# Patient Record
Sex: FEMALE | Race: OTHER
[De-identification: names, ages, dates, MRNs, and addresses within clinical notes are randomized per-mention and may not be internally consistent; named-entity substitution may affect disease eponyms.]

---

## 2021-01-09 ENCOUNTER — HOSPITAL ENCOUNTER (EMERGENCY)
Dept: HOSPITAL 62 - ER | Age: 31
Discharge: HOME | End: 2021-01-09
Payer: OTHER GOVERNMENT

## 2021-01-09 VITALS — SYSTOLIC BLOOD PRESSURE: 106 MMHG | DIASTOLIC BLOOD PRESSURE: 67 MMHG

## 2021-01-09 DIAGNOSIS — Z88.0: ICD-10-CM

## 2021-01-09 DIAGNOSIS — R00.2: ICD-10-CM

## 2021-01-09 DIAGNOSIS — Z88.6: ICD-10-CM

## 2021-01-09 DIAGNOSIS — I72.8: Primary | ICD-10-CM

## 2021-01-09 LAB
ADD MANUAL DIFF: NO
ALBUMIN SERPL-MCNC: 4.1 G/DL (ref 3.5–5)
ALP SERPL-CCNC: 39 U/L (ref 38–126)
ANION GAP SERPL CALC-SCNC: 5 MMOL/L (ref 5–19)
APPEARANCE UR: CLEAR
APTT PPP: COLORLESS S
AST SERPL-CCNC: 20 U/L (ref 14–36)
BASOPHILS # BLD AUTO: 0 10^3/UL (ref 0–0.2)
BASOPHILS NFR BLD AUTO: 0.7 % (ref 0–2)
BILIRUB DIRECT SERPL-MCNC: 0.1 MG/DL (ref 0–0.4)
BILIRUB SERPL-MCNC: 0.3 MG/DL (ref 0.2–1.3)
BILIRUB UR QL STRIP: NEGATIVE
BUN SERPL-MCNC: 18 MG/DL (ref 7–20)
CALCIUM: 9.3 MG/DL (ref 8.4–10.2)
CHLORIDE SERPL-SCNC: 104 MMOL/L (ref 98–107)
CO2 SERPL-SCNC: 30 MMOL/L (ref 22–30)
EOSINOPHIL # BLD AUTO: 0 10^3/UL (ref 0–0.6)
EOSINOPHIL NFR BLD AUTO: 0.7 % (ref 0–6)
ERYTHROCYTE [DISTWIDTH] IN BLOOD BY AUTOMATED COUNT: 12.2 % (ref 11.5–14)
GLUCOSE SERPL-MCNC: 74 MG/DL (ref 75–110)
GLUCOSE UR STRIP-MCNC: NEGATIVE MG/DL
HCT VFR BLD CALC: 33.9 % (ref 36–47)
HGB BLD-MCNC: 11.7 G/DL (ref 12–15.5)
KETONES UR STRIP-MCNC: NEGATIVE MG/DL
LYMPHOCYTES # BLD AUTO: 2 10^3/UL (ref 0.5–4.7)
LYMPHOCYTES NFR BLD AUTO: 29.8 % (ref 13–45)
MCH RBC QN AUTO: 30.5 PG (ref 27–33.4)
MCHC RBC AUTO-ENTMCNC: 34.6 G/DL (ref 32–36)
MCV RBC AUTO: 88 FL (ref 80–97)
MONOCYTES # BLD AUTO: 0.4 10^3/UL (ref 0.1–1.4)
MONOCYTES NFR BLD AUTO: 6.7 % (ref 3–13)
NEUTROPHILS # BLD AUTO: 4.2 10^3/UL (ref 1.7–8.2)
NEUTS SEG NFR BLD AUTO: 62.1 % (ref 42–78)
NITRITE UR QL STRIP: NEGATIVE
PH UR STRIP: 7 [PH] (ref 5–9)
PLATELET # BLD: 260 10^3/UL (ref 150–450)
POTASSIUM SERPL-SCNC: 4.2 MMOL/L (ref 3.6–5)
PROT SERPL-MCNC: 7 G/DL (ref 6.3–8.2)
PROT UR STRIP-MCNC: NEGATIVE MG/DL
RBC # BLD AUTO: 3.86 10^6/UL (ref 3.72–5.28)
SP GR UR STRIP: 1.04
TOTAL CELLS COUNTED % (AUTO): 100 %
UROBILINOGEN UR-MCNC: NEGATIVE MG/DL (ref ?–2)
WBC # BLD AUTO: 6.7 10^3/UL (ref 4–10.5)

## 2021-01-09 PROCEDURE — 93010 ELECTROCARDIOGRAM REPORT: CPT

## 2021-01-09 PROCEDURE — 93005 ELECTROCARDIOGRAM TRACING: CPT

## 2021-01-09 PROCEDURE — 71046 X-RAY EXAM CHEST 2 VIEWS: CPT

## 2021-01-09 PROCEDURE — 36415 COLL VENOUS BLD VENIPUNCTURE: CPT

## 2021-01-09 PROCEDURE — 84443 ASSAY THYROID STIM HORMONE: CPT

## 2021-01-09 PROCEDURE — 80053 COMPREHEN METABOLIC PANEL: CPT

## 2021-01-09 PROCEDURE — 71275 CT ANGIOGRAPHY CHEST: CPT

## 2021-01-09 PROCEDURE — 99285 EMERGENCY DEPT VISIT HI MDM: CPT

## 2021-01-09 PROCEDURE — 81001 URINALYSIS AUTO W/SCOPE: CPT

## 2021-01-09 PROCEDURE — 85025 COMPLETE CBC W/AUTO DIFF WBC: CPT

## 2021-01-09 PROCEDURE — 85379 FIBRIN DEGRADATION QUANT: CPT

## 2021-01-09 PROCEDURE — 84703 CHORIONIC GONADOTROPIN ASSAY: CPT

## 2021-01-09 NOTE — RADIOLOGY REPORT (SQ)
EXAM DESCRIPTION:  CHEST 2 VIEWS



IMAGES COMPLETED DATE/TIME:  1/9/2021 11:11 am



REASON FOR STUDY:  palpitations.



COMPARISON:  None.



EXAM PARAMETERS:  NUMBER OF VIEWS: two views

TECHNIQUE: Digital Frontal and Lateral radiographic views of the chest acquired.

RADIATION DOSE: NA

LIMITATIONS: none



FINDINGS:  LUNGS AND PLEURA: No opacities, masses or pneumothorax. No pleural effusion.

MEDIASTINUM AND HILAR STRUCTURES: No masses or contour abnormalities.

HEART AND VASCULAR STRUCTURES: Heart normal size.  No evidence for failure.

BONES: No acute findings.

HARDWARE: None in the chest.

OTHER: No other significant finding.



IMPRESSION:  NO ACUTE RADIOGRAPHIC FINDING IN THE CHEST.



TECHNICAL DOCUMENTATION:  JOB ID:  [id number]

 2011 [employer]- All Rights Reserved



Reading location - IP/workstation name: [email or web address]

## 2021-01-09 NOTE — ER DOCUMENT REPORT
ED Cardiac





- General


Chief Complaint: Palpitations


Stated Complaint: PALPITATIONS


Time Seen by Provider: 01/09/21 11:42


Notes: 





HPI: 30-year-old female presents today with a history of around 2 years of some 

palpitations.  Over the last week it is been worse.  It is usually when she is 

about to go to sleep.  Therefore not exertional.  No real nausea, vomiting, 

diaphoresis, calf pain or leg swelling.  No syncope no weakness or numbness to 

the arms or legs.








ROS:  See HPI


All other review of systems reviewed and otherwise negative





Reviewed vital signs and nursing note as charted by RN.





PHYSICAL EXAM: 





CONSTITUTIONAL: Alert and oriented and responds appropriately to questions. 

Well-appearing; well-nourished





HEAD: Normocephalic; atraumatic





EYES: PERRL; no nystagmus





ENT: Normal nose; no rhinorrhea; moist mucous membranes; pharynx without lesions

noted





NECK: Supple without meningismus; non-tender; no cervical lymphadenopathy, no 

masses





CARD: Regular rate and rhythm; no murmurs; symmetric distal pulses





RESP: Normal chest excursion without splinting or tachypnea; breath sounds clear

and equal bilaterally





ABD/GI: Normal bowel sounds; non-distended; soft, non-tender; no palpable 

organomegaly or masses





BACK: The back appears normal and is non-tender to palpation





EXT: Normal ROM in all joints; non-tender to palpation; no edema





SKIN: No acute lesions noted





NEURO: CN 2-12 intact; 5/5 bilateral upper and lower extremity strength with 

sensation intact to light touch





PSYCH: The patient's mood and manner are appropriate. Grooming and personal 

hygiene are appropriate.





- Related Data


Allergies/Adverse Reactions: 


                                        





codeine Allergy (Verified 01/09/21 11:49)


   


meperidine [From Demerol] Allergy (Verified 01/09/21 11:49)


   


Penicillins Allergy (Verified 01/09/21 11:49)


   











Past Medical History





- Social History


Smoking Status: Never Smoker


Frequency of alcohol use: Occasional


Drug Abuse: None


Family History: Reviewed & Not Pertinent


Patient has homicidal ideation: No





Physical Exam





- Vital signs


Vitals: 


                                        











Temp Pulse Resp BP Pulse Ox


 


 98.2 F   77   16   117/77   100 


 


 01/09/21 11:38  01/09/21 11:38  01/09/21 11:38  01/09/21 11:38  01/09/21 11:38














Course





- Re-evaluation


Re-evalutation: 





01/09/21 12:25


Given the above history and physical in this well-appearing female no acute 

distress, with vital signs as recorded, with a current heart rate in the 60s, we

will obtain basic labs, x-ray of the chest, and a D-dimer was ordered in triage 

secondary to the intermittent palpitations.





EKG shows heart of 69, normal sinus rhythm, normal axis, no obvious ST elevation

or depression.





01/09/21 12:58


X-ray of the chest as recorded.  D-dimer is elevated.  CTA has been ordered.





01/09/21 15:16


Labs and CT scan of the abdomen pelvis as recorded.  I did speak to radiology 

regarding a splenic aneurysm.  She states that this is just something to follow-

up with before the patient could possibly become pregnant.  Patient has been 

explained this.  She understands this as does her .  Vital signs are 

stable.  Patient understands the importance of following up with cardiology and 

staying away from caffeinated products.





Patient will be discharged home with strict return precautions.





- Vital Signs


Vital signs: 


                                        











Temp Pulse Resp BP Pulse Ox


 


 98.2 F   77   14   109/60   100 


 


 01/09/21 11:38  01/09/21 11:38  01/09/21 14:58  01/09/21 14:58  01/09/21 14:58














- Laboratory Results


Result Diagrams: 


                                 01/09/21 12:30





                                 01/09/21 12:30


Laboratory Results Interpreted: 


                                        











  01/09/21 01/09/21 01/09/21





  12:30 12:30 12:30


 


Hgb  11.7 L  


 


Hct  33.9 L  


 


D-Dimer    0.90 H


 


Glucose   74 L 











Critical Laboratory Results Reviewed: No Critical Results





- Radiology Results


Critical Radiology Results Reviewed: No Critical Results





Discharge





- Discharge


Clinical Impression: 


 Palpitations, Splenic artery aneurysm





Condition: Good


Disposition: HOME, SELF-CARE


Additional Instructions: 


Come back immediately for any worsening palpitations, chest pain, lighth

eadedness, dizziness, neetu pain, leg swelling, or any other acute problems.  

Please stay away from any caffeinated products and please follow-up with your 

primary care physician for reassessment of the small incidental dilation of your

splenic artery and the cardiologist possibly for the palpitations.  I have 

provided the phone number to the cardiologist.


Referrals: 


DORENE GLORIA MD [ACTIVE PROVISIONAL STAFF] - Follow up as needed

## 2021-01-09 NOTE — EKG REPORT
SEVERITY:- BORDERLINE ECG -

SINUS RHYTHM

INFERIOR Q WAVES, PROBABLY NORMAL VARIATION

:

Confirmed by: Dillan Conley 09-Jan-2021 22:40:05

## 2021-01-09 NOTE — ER DOCUMENT REPORT
ED Medical Screen (RME)





- General


Chief Complaint: Palpitations


Stated Complaint: PALPITATIONS


Time Seen by Provider: 01/09/21 11:42


Notes: 





HPI: 30-year-old female presenting with intermittent palpitations with 

associated shortness of breath that been daily for the last week.  States she 

has had intermittent episodes of this over the last few years but it has been 

very prominent this week.  States that when she takes a deep breath and she f

eels like she cannot catch her breath or take a deep breath.  She is on oral 

birth control.  She started this in September.





PHYSICAL EXAMINATION: Does not become dyspneic with speaking.  Not hypoxic.  EKG

normal sinus rhythm.  Lung sounds are clear to auscultation.  Regular rate and 

rhythm











I have greeted and performed a rapid initial assessment of this patient.  A 

comprehensive ED assessment and evaluation of the patient, analysis of test 

results and completion of medical decision making process will be conducted by 

an additional ED providers.  Please note that clinical decision making for this 

patient was made during the 2020 pandemic of novel coronavirus which caused a 

significant strain on the healthcare system including at this particular 

facility.  Criteria for admission discharge and level of care decisions as well 

as treatment decisions have necessarily changed





Physical Exam





- Vital signs


Vitals: 





                                        











Temp Pulse Resp BP Pulse Ox


 


 98.2 F   77   16   117/77   100 


 


 01/09/21 11:38  01/09/21 11:38  01/09/21 11:38  01/09/21 11:38  01/09/21 11:38














Course





- Vital Signs


Vital signs: 





                                        











Temp Pulse Resp BP Pulse Ox


 


 98.2 F   77   16   117/77   100 


 


 01/09/21 11:38  01/09/21 11:38  01/09/21 11:38  01/09/21 11:38  01/09/21 11:38

## 2021-01-09 NOTE — RADIOLOGY REPORT (SQ)
EXAM DESCRIPTION:  CTA CHEST



IMAGES COMPLETED DATE/TIME:  1/9/2021 1:01 pm



REASON FOR STUDY:  10; d dimer



COMPARISON:  Chest radiograph same date.



TECHNIQUE:  CT scan of the chest performed using helical scanning technique with dynamic intravenous 
contrast injection.  Images reviewed with lung, soft tissue and bone windows.  Reconstructed coronal 
and sagittal MPR images reviewed.

Additional 3 dimensional post-processing performed to develop Maximal Intensity Projection images (MI
P).  All images stored on PACS.

All CT scanners at this facility use dose modulation, iterative reconstruction, and/or weight based d
osing when appropriate to reduce radiation dose to as low as reasonably achievable (ALARA).

[facility]: Dose Right  [facility]: CareDose    [facility]: Dose Right    [facility]: Teradose 4D    [facility]: Smart Technologies



CONTRAST TYPE AND DOSE:  contrast/concentration: Isovue 350.00 mmol/ml; Total Contrast Delivered: 75.
0 ml; Total Saline Delivered: 45.0 ml

Contrast bolus optimized for the pulmonary arteries. Not diagnostic for the aorta.



RENAL FUNCTION:  GFR > 60.



RADIATION DOSE:  CT Rad equipment meets quality standard of care and radiation dose reduction techniq
ues were employed. CTDIvol: 6.6 - 14.3 mGy. DLP: 532 mGy-cm. .



LIMITATIONS:  None.



FINDINGS:  LUNGS AND PLEURA: Trachea has normal caliber and appearance.  No bronchial wall thickening
 or bronchiectasis.  No focal consolidation.  No pleural effusion or pneumothorax.  No significant gr
ound-glass attenuation.

AORTA AND GREAT VESSELS: No aneurysm.  Contrast bolus not optimized for the aorta.

HEART: No pericardial effusion. No significant coronary artery calcifications.

PULMONARY ARTERIES: No emboli visualized in the main pulmonary arteries or the segmental branches.

HILAR AND MEDIASTINAL STRUCTURES: No identified masses or abnormal nodes.

HARDWARE: None in the chest.

UPPER ABDOMEN: 1.2 cm splenic artery aneurysm.

THYROID AND OTHER SOFT TISSUES: No masses.  No adenopathy.

BONES: No acute or significant finding.

3D MIPS: Confirm above findings.

OTHER: No other significant finding.



IMPRESSION:

1. No acute pulmonary disease.  No pulmonary embolism.

2. 1.2 cm splenic artery aneurysm.  Vascular surgery consultation recommended.



COMMENT:  Quality ID # [id number]: Final reports with documentation of one or more dose reduction techniques
 (e.g., Automated exposure control, adjustment of the mA and/or kV according to patient size, use of 
iterative reconstruction technique)



TECHNICAL DOCUMENTATION:  JOB ID:  [id number]

 2011 [employer]- All Rights Reserved



Reading location - IP/workstation name: [email or web address]